# Patient Record
Sex: MALE | Race: WHITE | Employment: OTHER | URBAN - METROPOLITAN AREA
[De-identification: names, ages, dates, MRNs, and addresses within clinical notes are randomized per-mention and may not be internally consistent; named-entity substitution may affect disease eponyms.]

---

## 2021-03-16 ENCOUNTER — OFFICE VISIT (OUTPATIENT)
Dept: PRIMARY CARE CLINIC | Age: 36
End: 2021-03-16
Payer: MEDICAID

## 2021-03-16 VITALS
HEIGHT: 72 IN | HEART RATE: 88 BPM | TEMPERATURE: 97.3 F | SYSTOLIC BLOOD PRESSURE: 96 MMHG | WEIGHT: 187 LBS | DIASTOLIC BLOOD PRESSURE: 65 MMHG | BODY MASS INDEX: 25.33 KG/M2 | OXYGEN SATURATION: 99 %

## 2021-03-16 DIAGNOSIS — R79.0 LOW MAGNESIUM LEVEL: ICD-10-CM

## 2021-03-16 DIAGNOSIS — Z13.29 SCREENING FOR THYROID DISORDER: ICD-10-CM

## 2021-03-16 DIAGNOSIS — G47.00 INSOMNIA, UNSPECIFIED TYPE: ICD-10-CM

## 2021-03-16 DIAGNOSIS — Z11.59 NEED FOR HEPATITIS C SCREENING TEST: ICD-10-CM

## 2021-03-16 DIAGNOSIS — R21 RASH: Primary | ICD-10-CM

## 2021-03-16 DIAGNOSIS — Z13.0 SCREENING FOR DEFICIENCY ANEMIA: ICD-10-CM

## 2021-03-16 DIAGNOSIS — Z11.4 SCREENING FOR HIV (HUMAN IMMUNODEFICIENCY VIRUS): ICD-10-CM

## 2021-03-16 DIAGNOSIS — R21 RASH: ICD-10-CM

## 2021-03-16 DIAGNOSIS — Z13.1 SCREENING FOR DIABETES MELLITUS: ICD-10-CM

## 2021-03-16 LAB
A/G RATIO: 1.7 (ref 1.1–2.2)
ALBUMIN SERPL-MCNC: 4.3 G/DL (ref 3.4–5)
ALP BLD-CCNC: 47 U/L (ref 40–129)
ALT SERPL-CCNC: 15 U/L (ref 10–40)
ANION GAP SERPL CALCULATED.3IONS-SCNC: 9 MMOL/L (ref 3–16)
AST SERPL-CCNC: 16 U/L (ref 15–37)
BASOPHILS ABSOLUTE: 0 K/UL (ref 0–0.2)
BASOPHILS RELATIVE PERCENT: 0.7 %
BILIRUB SERPL-MCNC: 0.8 MG/DL (ref 0–1)
BUN BLDV-MCNC: 18 MG/DL (ref 7–20)
CALCIUM SERPL-MCNC: 9.3 MG/DL (ref 8.3–10.6)
CHLORIDE BLD-SCNC: 103 MMOL/L (ref 99–110)
CO2: 30 MMOL/L (ref 21–32)
CREAT SERPL-MCNC: 1 MG/DL (ref 0.9–1.3)
EOSINOPHILS ABSOLUTE: 0.2 K/UL (ref 0–0.6)
EOSINOPHILS RELATIVE PERCENT: 4 %
GFR AFRICAN AMERICAN: >60
GFR NON-AFRICAN AMERICAN: >60
GLOBULIN: 2.6 G/DL
GLUCOSE BLD-MCNC: 91 MG/DL (ref 70–99)
HCT VFR BLD CALC: 43.5 % (ref 40.5–52.5)
HEMOGLOBIN: 14.9 G/DL (ref 13.5–17.5)
HEPATITIS C ANTIBODY INTERPRETATION: NORMAL
LYMPHOCYTES ABSOLUTE: 2 K/UL (ref 1–5.1)
LYMPHOCYTES RELATIVE PERCENT: 37.5 %
MAGNESIUM: 2 MG/DL (ref 1.8–2.4)
MCH RBC QN AUTO: 31.3 PG (ref 26–34)
MCHC RBC AUTO-ENTMCNC: 34.2 G/DL (ref 31–36)
MCV RBC AUTO: 91.4 FL (ref 80–100)
MONOCYTES ABSOLUTE: 0.5 K/UL (ref 0–1.3)
MONOCYTES RELATIVE PERCENT: 9.7 %
NEUTROPHILS ABSOLUTE: 2.6 K/UL (ref 1.7–7.7)
NEUTROPHILS RELATIVE PERCENT: 48.1 %
PDW BLD-RTO: 12.9 % (ref 12.4–15.4)
PLATELET # BLD: 272 K/UL (ref 135–450)
PMV BLD AUTO: 8.6 FL (ref 5–10.5)
POTASSIUM SERPL-SCNC: 4.4 MMOL/L (ref 3.5–5.1)
RBC # BLD: 4.76 M/UL (ref 4.2–5.9)
SODIUM BLD-SCNC: 142 MMOL/L (ref 136–145)
T4 FREE: 1.2 NG/DL (ref 0.9–1.8)
TOTAL PROTEIN: 6.9 G/DL (ref 6.4–8.2)
TSH REFLEX: 1.84 UIU/ML (ref 0.27–4.2)
WBC # BLD: 5.4 K/UL (ref 4–11)

## 2021-03-16 PROCEDURE — 99204 OFFICE O/P NEW MOD 45 MIN: CPT | Performed by: NURSE PRACTITIONER

## 2021-03-16 PROCEDURE — G8484 FLU IMMUNIZE NO ADMIN: HCPCS | Performed by: NURSE PRACTITIONER

## 2021-03-16 PROCEDURE — G8419 CALC BMI OUT NRM PARAM NOF/U: HCPCS | Performed by: NURSE PRACTITIONER

## 2021-03-16 PROCEDURE — 4004F PT TOBACCO SCREEN RCVD TLK: CPT | Performed by: NURSE PRACTITIONER

## 2021-03-16 PROCEDURE — G8427 DOCREV CUR MEDS BY ELIG CLIN: HCPCS | Performed by: NURSE PRACTITIONER

## 2021-03-16 RX ORDER — UBIDECARENONE 75 MG
50 CAPSULE ORAL DAILY
COMMUNITY

## 2021-03-16 RX ORDER — VITAMIN E 268 MG
400 CAPSULE ORAL DAILY
COMMUNITY

## 2021-03-16 RX ORDER — MULTIVIT WITH MINERALS/LUTEIN
250 TABLET ORAL DAILY
COMMUNITY

## 2021-03-16 RX ORDER — LANOLIN ALCOHOL/MO/W.PET/CERES
3 CREAM (GRAM) TOPICAL DAILY
Qty: 30 TABLET | Refills: 3 | Status: SHIPPED | OUTPATIENT
Start: 2021-03-16

## 2021-03-16 RX ORDER — VITAMIN B COMPLEX
1 CAPSULE ORAL DAILY
COMMUNITY

## 2021-03-16 SDOH — ECONOMIC STABILITY: INCOME INSECURITY: HOW HARD IS IT FOR YOU TO PAY FOR THE VERY BASICS LIKE FOOD, HOUSING, MEDICAL CARE, AND HEATING?: NOT ASKED

## 2021-03-16 SDOH — ECONOMIC STABILITY: TRANSPORTATION INSECURITY
IN THE PAST 12 MONTHS, HAS THE LACK OF TRANSPORTATION KEPT YOU FROM MEDICAL APPOINTMENTS OR FROM GETTING MEDICATIONS?: NOT ASKED

## 2021-03-16 ASSESSMENT — ENCOUNTER SYMPTOMS
SHORTNESS OF BREATH: 0
DIARRHEA: 0
CONSTIPATION: 0
BLOOD IN STOOL: 0
CHEST TIGHTNESS: 0
ABDOMINAL PAIN: 0
VOMITING: 0

## 2021-03-16 ASSESSMENT — PATIENT HEALTH QUESTIONNAIRE - PHQ9
SUM OF ALL RESPONSES TO PHQ QUESTIONS 1-9: 0
SUM OF ALL RESPONSES TO PHQ QUESTIONS 1-9: 0
SUM OF ALL RESPONSES TO PHQ9 QUESTIONS 1 & 2: 0

## 2021-03-16 NOTE — PATIENT INSTRUCTIONS
Make appointment with Yung Colunga CNP  Have labs drawn  Prescriptions sent to pharmacy  Keep skin moisturized

## 2021-03-16 NOTE — PROGRESS NOTES
[address]  [address] [facility] [place] [address]  Dept: [phone]       3/16/2021    Shayy Vega (:  1985) carlos manuel 38 y.o. male, here to establish care. He is complaining of rash on back of both legs. Relocated here in January. Rash  This is a new problem. The current episode started more than 1 month ago. The affected locations include the left lower leg and right lower leg. The rash is characterized by dryness and itchiness. He was exposed to nothing. Pertinent negatives include no congestion, diarrhea, fever, shortness of breath or vomiting. Past treatments include moisturizer and anti-itch cream. The treatment provided mild relief. States the rash was not present while in [place]. The rash is intermittent. Reports having 2 dogs in the home. Admits to a history of drug and alcohol abuse. Last drink was over 5 months ago. Stop using cocaine around the same time. States he occasionally uses Marijuana edibles and CBD gummies to help with relaxation. He reports taking several supplements including:     takes cow's liver pill, vitamin E, B complex, B-12, vitamin C. Depression  Reports a history of depression, which has improved since he has been sober. States he exercises regularly and sees a therapist weekly. Has been in therapy consistently with this particular therapist for over 1 year. Overall has been in therapy for 3 years, seen a Psychiatrist 'briefly. \"  As a child was prescribed Ritalin and Adderall. Was prescribed Sertraline, \"It didn't agree with me. \" Has bouts of excitement and supper lows, has been diagnosed with Bipolar disorder. Denies emergency room visits or hospitalizations for depression. Denies homicidal/suicidal ideations.      Insomnia:  Current treatment: avoiding long naps during the day, decreasing caffeine consumption, going to sleep at the same time each night and limiting alcohol consumption, which has been somewhat effective. Medication side effects: none. Health Care Maintenance:  Influenza Vaccine: declined  Pneumonia Vaccine: declined  Covid Vaccine: information provided  Lab Results   Component Value Date    CHOL 130 08/09/2010     Lab Results   Component Value Date    TRIG 56 08/09/2010     Lab Results   Component Value Date    HDL 52 08/09/2010     Lab Results   Component Value Date    LDLCALC 67 08/09/2010     Lab Results   Component Value Date    LABVLDL 11 08/09/2010     Lab Results   Component Value Date    WBC 5.6 08/09/2010    HGB 16.2 08/09/2010    HCT 46.4 08/09/2010    MCV 91 08/09/2010     08/09/2010     Lab Results   Component Value Date     08/09/2010    K 4.0 08/09/2010    CL 99 08/09/2010    CO2 26 08/09/2010    BUN 15 08/09/2010    CREATININE 0.95 08/09/2010    GLUCOSE 96 08/09/2010    CALCIUM 9.2 08/09/2010    PROT 6.7 08/09/2010    LABALBU 4.5 08/09/2010    BILITOT 0.8 08/09/2010    ALKPHOS 45 08/09/2010    AST 14 08/09/2010    ALT 13 08/09/2010    AGRATIO 2.0 08/09/2010    GLOB 2.2 08/09/2010       Review of Systems   Constitutional: Negative for activity change and fever. HENT: Negative for congestion. Eyes: Negative for visual disturbance. Respiratory: Negative for chest tightness and shortness of breath. Cardiovascular: Negative for chest pain, palpitations and leg swelling. Gastrointestinal: Negative for abdominal pain, blood in stool, constipation, diarrhea and vomiting. Endocrine: Negative for polyuria. Genitourinary: Negative for decreased urine volume, discharge, dysuria and frequency. Musculoskeletal: Negative for arthralgias and myalgias. Skin: Positive for rash (on both lower legs). Neurological: Negative for dizziness, light-headedness and headaches. Psychiatric/Behavioral: Positive for sleep disturbance. Negative for agitation and decreased concentration. The patient is not nervous/anxious.          History of depression       Prior to Visit Physical activity     Days per week: Not on file     Minutes per session: Not on file    Stress: Not on file   Relationships    Social connections     Talks on phone: Not on file     Gets together: Not on file     Attends Zoroastrian service: Not on file     Active member of club or organization: Not on file     Attends meetings of clubs or organizations: Not on file     Relationship status: Not on file    Intimate partner violence     Fear of current or ex partner: Not on file     Emotionally abused: Not on file     Physically abused: Not on file     Forced sexual activity: Not on file   Other Topics Concern    Not on file   Social History Narrative    Lives with roommate        Family History   Problem Relation Age of Onset    No Known Problems Mother     No Known Problems Father     Heart Attack Paternal Uncle     Cancer Paternal Uncle     Heart Disease Paternal Grandmother     Cancer Paternal Cousin        Vitals:    03/16/21 0908   BP: 96/65   Pulse: 88   Temp: 97.3 °F (36.3 °C)   TempSrc: Temporal   SpO2: 99%   Weight: 187 lb (84.8 kg)   Height: 6' (1.829 m)     Estimated body mass index is 25.36 kg/m² as calculated from the following:    Height as of this encounter: 6' (1.829 m). Weight as of this encounter: 187 lb (84.8 kg). Physical Exam  Vitals signs reviewed. Constitutional:       Appearance: He is well-developed. He is not diaphoretic. HENT:      Head: Normocephalic. Right Ear: Hearing and external ear normal. No tenderness. Left Ear: Hearing and external ear normal. No tenderness. Nose: Nose normal.   Eyes:      General: Lids are normal.   Cardiovascular:      Rate and Rhythm: Normal rate and regular rhythm. Heart sounds: S1 normal and S2 normal. Murmur present. Systolic murmur present with a grade of 2/6. Pulmonary:      Effort: Pulmonary effort is normal.      Breath sounds: Normal breath sounds. Musculoskeletal: Normal range of motion.    Lymphadenopathy: Head:      Right side of head: No submental or submandibular adenopathy. Left side of head: No submental or submandibular adenopathy. Cervical:      Right cervical: No superficial cervical adenopathy. Left cervical: No superficial cervical adenopathy. Skin:     General: Skin is warm and dry. Findings: Rash present. Rash is macular, papular and urticarial.      Nails: There is no clubbing. Neurological:      Mental Status: He is alert and oriented to person, place, and time. GCS: GCS eye subscore is 4. GCS verbal subscore is 5. GCS motor subscore is 6. Psychiatric:         Speech: Speech normal.         Behavior: Behavior normal. Behavior is cooperative. Judgment: Judgment normal.         ASSESSMENT/PLAN:  1. Screening for deficiency anemia    - CBC Auto Differential; Future    2. Screening for diabetes mellitus    - Comprehensive Metabolic Panel; Future    3. Screening for thyroid disorder    - TSH with Reflex; Future  - T4, Free; Future    4. Need for hepatitis C screening test    - Hepatitis C Antibody; Future    5. Screening for HIV (human immunodeficiency virus)    - HIV Screen; Future    6. Rash  -Contact Dermatitis vs Atopic Dermatitis  - Respiratory allergen profile; Future  -Apply cream to affected areas  - hydrocortisone 2.5 % cream; Apply topically 2 times daily. Dispense: 3.5 g; Refill: 2    7. Low magnesium level    - Magnesium; Future    8. Insomnia, unspecified type  -Reports intermittent insomnia  -Take Melatonin as needed for insomnia  - melatonin (RA MELATONIN) 3 MG TABS tablet; Take 1 tablet by mouth daily  Dispense: 30 tablet; Refill: 3      Return in about 6 months (around 9/16/2021).       --MOOK Peña - CNP on 3/16/2021 at 10:58 AM

## 2021-03-17 LAB
HIV AG/AB: NORMAL
HIV ANTIGEN: NORMAL
HIV-1 ANTIBODY: NORMAL
HIV-2 AB: NORMAL

## 2021-03-20 LAB
ALLERGEN BERMUDA GRASS IGE: <0.1 KU/L (ref 0–0.34)
ALLERGEN BIRCH IGE: NORMAL KU/L (ref 0–0.34)
ALLERGEN DOG DANDER IGE: <0.1 KU/L (ref 0–0.34)
ALLERGEN GERMAN COCKROACH IGE: <0.1 KU/L (ref 0–0.34)
ALLERGEN HORMODENDRUM IGE: <0.1 KUL/L (ref 0–0.34)
ALLERGEN MOUSE EPITHELIA IGE: <0.1 KU/L (ref 0–0.34)
ALLERGEN OAK TREE IGE: NORMAL KU/L (ref 0–0.34)
ALLERGEN PECAN TREE IGE: NORMAL KU/L (ref 0–0.34)
ALLERGEN PIGWEED ROUGH IGE: NORMAL KU/L (ref 0–0.34)
ALLERGEN SHEEP SORREL (W18) IGE: NORMAL KU/L (ref 0–0.34)
ALLERGEN TREE SYCAMORE: <0.1 KU/L (ref 0–0.34)
ALLERGEN WALNUT TREE IGE: <0.1 KU/L (ref 0–0.34)
ALLERGEN WHITE MULBERRY TREE, IGE: NORMAL KU/L (ref 0–0.34)
ALLERGEN, TREE, WHITE ASH IGE: <0.1 KU/L (ref 0–0.34)
ALTERNARIA ALTERNATA: <0.1 KU/L (ref 0–0.34)
ASPERGILLUS FUMIGATUS: <0.1 KU/L (ref 0–0.34)
CAT DANDER ANTIBODY: <0.1 KU/L (ref 0–0.34)
COTTONWOOD TREE: <0.1 KU/L (ref 0–0.34)
D. FARINAE: <0.1 KU/L (ref 0–0.34)
D. PTERONYSSINUS: <0.1 KU/L (ref 0–0.34)
ELM TREE: NORMAL KU/L (ref 0–0.34)
IGE: 28 IU/ML
MAPLE/BOXELDER TREE: <0.1 KU/L (ref 0–0.34)
MOUNTAIN CEDAR TREE: NORMAL KU/L (ref 0–0.34)
MUCOR RACEMOSUS: <0.1 KU/L (ref 0–0.34)
P. NOTATUM: <0.1 KU/L (ref 0–0.34)
RUSSIAN THISTLE: NORMAL KU/L (ref 0–0.34)
SHORT RAGWD(A ARTEMIS.) IGE: NORMAL KU/L (ref 0–0.34)
TIMOTHY GRASS: <0.1 KU/L (ref 0–0.34)

## 2021-03-23 ENCOUNTER — TELEPHONE (OUTPATIENT)
Dept: PRIMARY CARE CLINIC | Age: 36
End: 2021-03-23

## 2021-03-23 NOTE — TELEPHONE ENCOUNTER
----- Message from MOOK Peace CNP sent at 3/17/2021 12:56 PM EDT -----  HIV and Hep C negative.  Kidney, liver, blood count, thyroid labs are normal.